# Patient Record
Sex: MALE | NOT HISPANIC OR LATINO
[De-identification: names, ages, dates, MRNs, and addresses within clinical notes are randomized per-mention and may not be internally consistent; named-entity substitution may affect disease eponyms.]

---

## 2017-01-04 PROCEDURE — 87088 URINE BACTERIA CULTURE: CPT | Performed by: UROLOGY

## 2017-01-04 PROCEDURE — 87086 URINE CULTURE/COLONY COUNT: CPT | Performed by: UROLOGY

## 2017-01-04 PROCEDURE — 87186 SC STD MICRODIL/AGAR DIL: CPT | Performed by: UROLOGY

## 2017-01-04 PROCEDURE — 81001 URINALYSIS AUTO W/SCOPE: CPT | Performed by: UROLOGY

## 2017-02-21 PROCEDURE — 88108 CYTOPATH CONCENTRATE TECH: CPT | Performed by: UROLOGY

## 2017-04-27 PROCEDURE — 87086 URINE CULTURE/COLONY COUNT: CPT | Performed by: UROLOGY

## 2017-05-10 ENCOUNTER — PRIOR ORIGINAL RECORDS (OUTPATIENT)
Dept: OTHER | Age: 77
End: 2017-05-10

## 2017-05-18 ENCOUNTER — PRIOR ORIGINAL RECORDS (OUTPATIENT)
Dept: OTHER | Age: 77
End: 2017-05-18

## 2017-07-17 ENCOUNTER — HOSPITAL ENCOUNTER (OUTPATIENT)
Dept: MRI IMAGING | Facility: HOSPITAL | Age: 77
Discharge: HOME | End: 2017-07-17
Attending: NURSE PRACTITIONER
Payer: MEDICARE

## 2017-07-17 DIAGNOSIS — K62.89 ANAL OR RECTAL PAIN: ICD-10-CM

## 2017-07-17 PROCEDURE — A9575 INJ GADOTERATE MEGLUMI 0.1ML: HCPCS | Performed by: RADIOLOGY

## 2017-07-17 PROCEDURE — 72196 MRI PELVIS W/DYE: CPT | Performed by: NURSE PRACTITIONER

## 2017-11-14 PROCEDURE — 81015 MICROSCOPIC EXAM OF URINE: CPT | Performed by: UROLOGY

## 2017-11-14 PROCEDURE — 87086 URINE CULTURE/COLONY COUNT: CPT | Performed by: UROLOGY

## 2017-12-31 ENCOUNTER — PRIOR ORIGINAL RECORDS (OUTPATIENT)
Dept: OTHER | Age: 77
End: 2017-12-31

## 2018-03-01 ENCOUNTER — HOSPITAL ENCOUNTER (OUTPATIENT)
Dept: CV DIAGNOSTICS | Facility: HOSPITAL | Age: 78
Discharge: HOME | End: 2018-03-01
Attending: INTERNAL MEDICINE
Payer: MEDICARE

## 2018-03-01 DIAGNOSIS — I48.0 PAROXYSMAL A-FIB (HCC): ICD-10-CM

## 2018-03-01 DIAGNOSIS — I25.10 CORONARY ARTERY DISEASE INVOLVING NATIVE CORONARY ARTERY OF NATIVE HEART WITHOUT ANGINA PECTORIS: ICD-10-CM

## 2018-03-01 DIAGNOSIS — Z79.01 ANTICOAGULANT LONG-TERM USE: ICD-10-CM

## 2018-03-01 PROCEDURE — 78452 HT MUSCLE IMAGE SPECT MULT: CPT | Performed by: INTERNAL MEDICINE

## 2018-03-01 PROCEDURE — 93018 CV STRESS TEST I&R ONLY: CPT | Performed by: INTERNAL MEDICINE

## 2018-03-01 PROCEDURE — 93017 CV STRESS TEST TRACING ONLY: CPT | Performed by: INTERNAL MEDICINE

## 2018-05-16 ENCOUNTER — PRIOR ORIGINAL RECORDS (OUTPATIENT)
Dept: OTHER | Age: 78
End: 2018-05-16

## 2018-08-28 ENCOUNTER — APPOINTMENT (OUTPATIENT)
Dept: LAB | Facility: HOSPITAL | Age: 78
End: 2018-08-28
Attending: INTERNAL MEDICINE
Payer: MEDICARE

## 2018-08-28 DIAGNOSIS — I48.20 CHRONIC ATRIAL FIBRILLATION (HCC): ICD-10-CM

## 2018-08-28 DIAGNOSIS — Z79.01 ANTICOAGULANT LONG-TERM USE: ICD-10-CM

## 2018-08-28 DIAGNOSIS — I25.10 CORONARY ARTERY DISEASE INVOLVING NATIVE CORONARY ARTERY OF NATIVE HEART WITHOUT ANGINA PECTORIS: ICD-10-CM

## 2018-08-28 DIAGNOSIS — E78.5 DYSLIPIDEMIA: ICD-10-CM

## 2018-08-28 LAB
ALBUMIN SERPL-MCNC: 3.9 G/DL (ref 3.5–4.8)
ALP LIVER SERPL-CCNC: 66 U/L (ref 45–117)
ALT SERPL-CCNC: 29 U/L (ref 17–63)
AST SERPL-CCNC: 18 U/L (ref 15–41)
BILIRUB DIRECT SERPL-MCNC: 0.2 MG/DL (ref 0.1–0.5)
BILIRUB SERPL-MCNC: 0.7 MG/DL (ref 0.1–2)
CHOLEST SMN-MCNC: 123 MG/DL (ref ?–200)
EST. AVERAGE GLUCOSE BLD GHB EST-MCNC: 126 MG/DL (ref 68–126)
HBA1C MFR BLD HPLC: 6 % (ref ?–5.7)
HDLC SERPL-MCNC: 30 MG/DL (ref 40–59)
LDLC SERPL CALC-MCNC: 55 MG/DL (ref ?–100)
M PROTEIN MFR SERPL ELPH: 7 G/DL (ref 6.1–8.3)
NONHDLC SERPL-MCNC: 93 MG/DL (ref ?–130)
TRIGL SERPL-MCNC: 189 MG/DL (ref 30–149)
VLDLC SERPL CALC-MCNC: 38 MG/DL (ref 0–30)

## 2018-08-28 PROCEDURE — 80076 HEPATIC FUNCTION PANEL: CPT

## 2018-08-28 PROCEDURE — 83036 HEMOGLOBIN GLYCOSYLATED A1C: CPT

## 2018-08-28 PROCEDURE — 80061 LIPID PANEL: CPT

## 2018-08-28 PROCEDURE — 36415 COLL VENOUS BLD VENIPUNCTURE: CPT

## 2018-09-22 ENCOUNTER — HOSPITAL ENCOUNTER (OUTPATIENT)
Dept: MRI IMAGING | Facility: HOSPITAL | Age: 78
Discharge: HOME | End: 2018-09-22
Attending: INTERNAL MEDICINE
Payer: MEDICARE

## 2018-09-22 DIAGNOSIS — K86.2 PANCREAS CYST: ICD-10-CM

## 2018-09-22 LAB — CREAT SERPL-MCNC: 1.1 MG/DL (ref 0.7–1.3)

## 2018-09-22 PROCEDURE — 76376 3D RENDER W/INTRP POSTPROCES: CPT | Performed by: INTERNAL MEDICINE

## 2018-09-22 PROCEDURE — A9575 INJ GADOTERATE MEGLUMI 0.1ML: HCPCS | Performed by: INTERNAL MEDICINE

## 2018-09-22 PROCEDURE — 74183 MRI ABD W/O CNTR FLWD CNTR: CPT | Performed by: INTERNAL MEDICINE

## 2018-09-22 PROCEDURE — 82565 ASSAY OF CREATININE: CPT

## 2018-09-29 NOTE — PROGRESS NOTES
Date: 2018    To: Jaclyn Vora  : 1940    I hope this letter finds you doing well.   I am writing to inform you of the following:     Compare   The results of your recent MRI: Compared to the MRI , there is increase in the size and num

## 2018-11-01 PROBLEM — K86.2 PANCREAS CYST: Status: ACTIVE | Noted: 2018-11-01

## 2018-12-31 ENCOUNTER — PRIOR ORIGINAL RECORDS (OUTPATIENT)
Dept: OTHER | Age: 78
End: 2018-12-31

## 2019-02-07 ENCOUNTER — APPOINTMENT (OUTPATIENT)
Dept: CT IMAGING | Facility: HOSPITAL | Age: 79
End: 2019-02-07
Attending: STUDENT IN AN ORGANIZED HEALTH CARE EDUCATION/TRAINING PROGRAM
Payer: MEDICARE

## 2019-02-07 ENCOUNTER — HOSPITAL ENCOUNTER (EMERGENCY)
Facility: HOSPITAL | Age: 79
Discharge: HOME | End: 2019-02-08
Attending: STUDENT IN AN ORGANIZED HEALTH CARE EDUCATION/TRAINING PROGRAM
Payer: MEDICARE

## 2019-02-07 VITALS
HEART RATE: 86 BPM | SYSTOLIC BLOOD PRESSURE: 146 MMHG | TEMPERATURE: 98 F | WEIGHT: 225 LBS | OXYGEN SATURATION: 96 % | RESPIRATION RATE: 18 BRPM | DIASTOLIC BLOOD PRESSURE: 75 MMHG | HEIGHT: 68 IN | BODY MASS INDEX: 34.1 KG/M2

## 2019-02-07 DIAGNOSIS — N20.1 URETEROLITHIASIS: Primary | ICD-10-CM

## 2019-02-07 DIAGNOSIS — R31.9 HEMATURIA, UNSPECIFIED TYPE: ICD-10-CM

## 2019-02-07 DIAGNOSIS — N40.0 ENLARGED PROSTATE: ICD-10-CM

## 2019-02-07 LAB
ALBUMIN SERPL-MCNC: 4 G/DL (ref 3.1–4.5)
ALBUMIN/GLOB SERPL: 1.4 {RATIO} (ref 1–2)
ALP LIVER SERPL-CCNC: 63 U/L (ref 45–117)
ALT SERPL-CCNC: 33 U/L (ref 17–63)
ANION GAP SERPL CALC-SCNC: 9 MMOL/L (ref 0–18)
AST SERPL-CCNC: 28 U/L (ref 15–41)
BASOPHILS # BLD AUTO: 0.04 X10(3) UL (ref 0–0.2)
BASOPHILS NFR BLD AUTO: 0.5 %
BILIRUB SERPL-MCNC: 0.7 MG/DL (ref 0.1–2)
BILIRUB UR QL STRIP.AUTO: NEGATIVE
BUN BLD-MCNC: 16 MG/DL (ref 8–20)
BUN/CREAT SERPL: 15.8 (ref 10–20)
CALCIUM BLD-MCNC: 9.4 MG/DL (ref 8.3–10.3)
CHLORIDE SERPL-SCNC: 107 MMOL/L (ref 101–111)
CLARITY UR REFRACT.AUTO: CLEAR
CO2 SERPL-SCNC: 25 MMOL/L (ref 22–32)
COLOR UR AUTO: YELLOW
CREAT BLD-MCNC: 1.01 MG/DL (ref 0.7–1.3)
DEPRECATED RDW RBC AUTO: 43.8 FL (ref 35.1–46.3)
EOSINOPHIL # BLD AUTO: 0.23 X10(3) UL (ref 0–0.7)
EOSINOPHIL NFR BLD AUTO: 2.8 %
ERYTHROCYTE [DISTWIDTH] IN BLOOD BY AUTOMATED COUNT: 12.6 % (ref 11–15)
GLOBULIN PLAS-MCNC: 2.9 G/DL (ref 2.8–4.4)
GLUCOSE BLD-MCNC: 93 MG/DL (ref 70–99)
GLUCOSE UR STRIP.AUTO-MCNC: NEGATIVE MG/DL
HCT VFR BLD AUTO: 41 % (ref 39–53)
HGB BLD-MCNC: 14.3 G/DL (ref 13–17.5)
IMM GRANULOCYTES # BLD AUTO: 0.02 X10(3) UL (ref 0–1)
IMM GRANULOCYTES NFR BLD: 0.2 %
LEUKOCYTE ESTERASE UR QL STRIP.AUTO: NEGATIVE
LIPASE: 205 U/L (ref 73–393)
LYMPHOCYTES # BLD AUTO: 1.77 X10(3) UL (ref 1–4)
LYMPHOCYTES NFR BLD AUTO: 21.7 %
M PROTEIN MFR SERPL ELPH: 6.9 G/DL (ref 6.4–8.2)
MCH RBC QN AUTO: 33 PG (ref 26–34)
MCHC RBC AUTO-ENTMCNC: 34.9 G/DL (ref 31–37)
MCV RBC AUTO: 94.7 FL (ref 80–100)
MONOCYTES # BLD AUTO: 0.68 X10(3) UL (ref 0.1–1)
MONOCYTES NFR BLD AUTO: 8.3 %
NEUTROPHILS # BLD AUTO: 5.42 X10 (3) UL (ref 1.5–7.7)
NEUTROPHILS # BLD AUTO: 5.42 X10(3) UL (ref 1.5–7.7)
NEUTROPHILS NFR BLD AUTO: 66.5 %
NITRITE UR QL STRIP.AUTO: NEGATIVE
OSMOLALITY SERPL CALC.SUM OF ELEC: 293 MOSM/KG (ref 275–295)
PH UR STRIP.AUTO: 6 [PH] (ref 4.5–8)
PLATELET # BLD AUTO: 182 10(3)UL (ref 150–450)
POTASSIUM SERPL-SCNC: 3.8 MMOL/L (ref 3.6–5.1)
PROT UR STRIP.AUTO-MCNC: NEGATIVE MG/DL
RBC # BLD AUTO: 4.33 X10(6)UL (ref 3.8–5.8)
RBC #/AREA URNS AUTO: >10 /HPF
SODIUM SERPL-SCNC: 141 MMOL/L (ref 136–144)
SP GR UR STRIP.AUTO: 1.01 (ref 1–1.03)
UROBILINOGEN UR STRIP.AUTO-MCNC: <2 MG/DL
WBC # BLD AUTO: 8.2 X10(3) UL (ref 4–11)

## 2019-02-07 PROCEDURE — 80053 COMPREHEN METABOLIC PANEL: CPT | Performed by: STUDENT IN AN ORGANIZED HEALTH CARE EDUCATION/TRAINING PROGRAM

## 2019-02-07 PROCEDURE — 83690 ASSAY OF LIPASE: CPT | Performed by: STUDENT IN AN ORGANIZED HEALTH CARE EDUCATION/TRAINING PROGRAM

## 2019-02-07 PROCEDURE — 74176 CT ABD & PELVIS W/O CONTRAST: CPT | Performed by: STUDENT IN AN ORGANIZED HEALTH CARE EDUCATION/TRAINING PROGRAM

## 2019-02-07 PROCEDURE — 87086 URINE CULTURE/COLONY COUNT: CPT | Performed by: STUDENT IN AN ORGANIZED HEALTH CARE EDUCATION/TRAINING PROGRAM

## 2019-02-07 PROCEDURE — 81001 URINALYSIS AUTO W/SCOPE: CPT | Performed by: STUDENT IN AN ORGANIZED HEALTH CARE EDUCATION/TRAINING PROGRAM

## 2019-02-07 PROCEDURE — 85025 COMPLETE CBC W/AUTO DIFF WBC: CPT | Performed by: STUDENT IN AN ORGANIZED HEALTH CARE EDUCATION/TRAINING PROGRAM

## 2019-02-07 PROCEDURE — 99284 EMERGENCY DEPT VISIT MOD MDM: CPT

## 2019-02-07 PROCEDURE — 96360 HYDRATION IV INFUSION INIT: CPT

## 2019-02-07 PROCEDURE — 96361 HYDRATE IV INFUSION ADD-ON: CPT

## 2019-02-08 NOTE — ED PROVIDER NOTES
Patient Seen in: [facility] Emergency Department    History   Patient presents with:  Abdomen/Flank Pain (GI/)    Stated Complaint: abdominal pain for 2 day. No BM for 2 days.      HPI    20-year-old male who presents emergency department complaini infarction [facility]) 2004    per EKG   • Neuropathy     bilat lower leg, foot neuropathy   • Osteoarthritis     general   • Pain in joints    • Pancreas cyst     being monitored, last test few months ago; more than one cyst   • Paroxysmal A-fib ([address]    • Casie Device None (Room air)       Current:/75   Pulse 86   Temp 97.8 °F (36.6 °C) (Temporal)   Resp 18   Ht 172.7 cm (5' 8\")   Wt 102.1 kg   SpO2 96%   BMI 34.21 kg/m²         Physical Exam    Constitutional: oriented to person, place, and time, appears DRAW LIGHT GREEN   RAINBOW DRAW GOLD   URINE CULTURE, ROUTINE   CBC W/ DIFFERENTIAL            MDM     Extensive differential was considered including ureterolithiasis, pyelonephritis, hydronephrosis and other urologic, infectious, musculoskeletal, vascula

## 2019-02-08 NOTE — ED INITIAL ASSESSMENT (HPI)
68 y/o male to ED with c/o of abdominal pain and left flank pain. Patient reports pain started x2 days ago, last bowel movement x3 days ago, reports he administered enema at home x2 yesterday which did produce stool.  Patient concerned for bowel obstruction

## 2019-04-27 ENCOUNTER — HOSPITAL ENCOUNTER (OUTPATIENT)
Dept: MRI IMAGING | Facility: HOSPITAL | Age: 79
Discharge: HOME | End: 2019-04-27
Attending: INTERNAL MEDICINE
Payer: MEDICARE

## 2019-04-27 DIAGNOSIS — K86.2 PANCREAS CYST: ICD-10-CM

## 2019-04-27 PROCEDURE — A9575 INJ GADOTERATE MEGLUMI 0.1ML: HCPCS | Performed by: INTERNAL MEDICINE

## 2019-04-27 PROCEDURE — 76376 3D RENDER W/INTRP POSTPROCES: CPT | Performed by: INTERNAL MEDICINE

## 2019-04-27 PROCEDURE — 74183 MRI ABD W/O CNTR FLWD CNTR: CPT | Performed by: INTERNAL MEDICINE

## 2019-05-06 NOTE — PROGRESS NOTES
Date: 2019    To: Nicole Lowery  : 1940    I hope this letter finds you doing well. I am writing to inform you of the following: The results of your recent MRI showed stable pancreas cystic lesions.          I am advising you to under

## 2019-05-15 ENCOUNTER — PRIOR ORIGINAL RECORDS (OUTPATIENT)
Dept: OTHER | Age: 79
End: 2019-05-15

## 2020-10-09 LAB
ALBUMIN/GLOB SERPL: 1.5 (CALC) (ref 1–2.5)
ALBUMIN/GLOB SERPL: 2.1 (CALC) (ref 1–2.5)
ALBUMIN/GLOB SERPL: 2.2 (CALC) (ref 1–2.5)
ALBUMIN/GLOB SERPL: 2.3 (CALC) (ref 1–2.5)
ALBUMIN: 4.1 G/DL (ref 3.6–5.1)
ALBUMIN: 4.1 G/DL (ref 3.6–5.1)
ALBUMIN: 4.5 G/DL (ref 3.6–5.1)
ALBUMIN: 4.5 G/DL (ref 3.6–5.1)
ALKALINE PHOSPHATASE: 55 UNIT/L (ref 40–115)
ALKALINE PHOSPHATASE: 70 UNIT/L (ref 40–115)
ALKALINE PHOSPHATASE: 74 UNIT/L (ref 40–115)
ALKALINE PHOSPHATASE: 83 UNIT/L (ref 40–115)
ALT: 18 UNIT/L (ref 9–46)
ALT: 18 UNIT/L (ref 9–46)
ALT: 25 UNIT/L (ref 9–46)
ALT: 9 UNIT/L (ref 9–46)
AST: 14 UNIT/L (ref 10–35)
AST: 17 UNIT/L (ref 10–35)
AST: 18 UNIT/L (ref 10–35)
AST: 23 UNIT/L (ref 10–35)
BASO%: 0.3 %
BASO%: 0.4 %
BASO%: 0.4 %
BASO: 0 10^3/UL
BILIRUBIN, TOTAL: 0.4 MG/DL (ref 0.2–1.2)
BILIRUBIN, TOTAL: 0.5 MG/DL (ref 0.2–1.2)
BILIRUBIN, TOTAL: 0.6 MG/DL (ref 0.2–1.2)
BILIRUBIN, TOTAL: 0.6 MG/DL (ref 0.2–1.2)
BUN/CREATININE RATIO: 37 (CALC) (ref 6–22)
BUN/CREATININE RATIO: ABNORMAL (CALC) (ref 6–22)
CALCIUM: 10.1 MG/DL (ref 8.6–10.3)
CALCIUM: 9.4 MG/DL (ref 8.6–10.3)
CALCIUM: 9.5 MG/DL (ref 8.6–10.3)
CALCIUM: 9.7 MG/DL (ref 8.6–10.3)
CARBON DIOXIDE: 21 MMOL/L (ref 20–31)
CARBON DIOXIDE: 23 MMOL/L (ref 20–31)
CARBON DIOXIDE: 23 MMOL/L (ref 20–31)
CARBON DIOXIDE: 23 MMOL/L (ref 20–32)
CHLORIDE: 105 MMOL/L (ref 98–110)
CHLORIDE: 106 MMOL/L (ref 98–110)
CRCL (C&G) (MOSAIQ HL): 101.08 ML/MIN
CRCL (C&G) (MOSAIQ HL): 107.74 ML/MIN
CRCL (C&G) (MOSAIQ HL): 91.41 ML/MIN
CRCL (C&G) (MOSAIQ HL): 99.05 ML/MIN
CREATININE CLEARANCE (MOSAIQ HL): 54.5 ML/MIN
CREATININE CLEARANCE (MOSAIQ HL): 57.8 ML/MIN
CREATININE CLEARANCE (MOSAIQ HL): 58.2 ML/MIN
CREATININE CLEARANCE (MOSAIQ HL): 64.3 ML/MIN
CREATININE: 0.84 MG/DL (ref 0.7–1.18)
CREATININE: 0.9 MG/DL (ref 0.7–1.18)
CREATININE: 0.92 MG/DL (ref 0.7–1.18)
CREATININE: 0.99 MG/DL (ref 0.7–1.18)
EGFR AFRICAN AMERICAN: 86 ML/MIN/1.73M2
EGFR AFRICAN AMERICAN: 93 ML/MIN/1.73M2
EGFR AFRICAN AMERICAN: 95 ML/MIN/1.73M2
EGFR AFRICAN AMERICAN: 99 ML/MIN/1.73M2
EGFR NON-AFR. AMERICAN: 74 ML/MIN/1.73M2
EGFR NON-AFR. AMERICAN: 81 ML/MIN/1.73M2
EGFR NON-AFR. AMERICAN: 82 ML/MIN/1.73M2
EGFR NON-AFR. AMERICAN: 86 ML/MIN/1.73M2
EOS%: 4 %
EOS%: 4.2 %
EOS%: 4.4 %
EOS: 0.2 10^3/UL
EOS: 0.2 10^3/UL
EOS: 0.3 10^3/UL
GLOBULIN: 1.9 G/DL (CALC) (ref 1.9–3.7)
GLOBULIN: 2 G/DL (CALC) (ref 1.9–3.7)
GLOBULIN: 2 G/DL (CALC) (ref 1.9–3.7)
GLOBULIN: 3.1 G/DL (CALC) (ref 1.9–3.7)
GLUCOSE: 107 MG/DL (ref 65–99)
GLUCOSE: 126 MG/DL (ref 65–99)
GLUCOSE: 128 MG/DL (ref 65–99)
GLUCOSE: 143 MG/DL (ref 65–99)
HCT: 38.8 % (ref 38–54)
HCT: 38.9 % (ref 38–54)
HCT: 40.3 % (ref 38–54)
HGB: 13.3 G/DL (ref 12–18)
HGB: 13.6 G/DL (ref 12–18)
HGB: 13.8 G/DL (ref 12–18)
IG A: 128 MG/DL (ref 81–463)
IG A: 41 MG/DL (ref 81–463)
IG A: 46 MG/DL (ref 81–463)
IG A: 48 MG/DL (ref 81–463)
IG G: 1267 MG/DL (ref 694–1618)
IG G: 398 MG/DL (ref 694–1618)
IG G: 414 MG/DL (ref 694–1618)
IG G: 429 MG/DL (ref 694–1618)
IG M: 49 MG/DL (ref 48–271)
IG M: 8 MG/DL (ref 48–271)
IG M: 8 MG/DL (ref 48–271)
IG M: <5 MG/DL (ref 48–271)
LYMPH%: 22.1 % (ref 12–44)
LYMPH%: 22.4 % (ref 12–44)
LYMPH%: 28.3 % (ref 12–44)
LYMPH: 1.2 10^3/UL (ref 0.8–2.8)
LYMPH: 1.3 10^3/UL (ref 0.8–2.8)
LYMPH: 1.6 10^3/UL (ref 0.8–2.8)
MCH: 33.1 PG (ref 26–33)
MCH: 33.3 PG (ref 26–33)
MCH: 33.4 PG (ref 26–33)
MCHC: 34.2 G/DL (ref 31–36)
MCHC: 34.2 G/DL (ref 31–36)
MCHC: 35.1 G/DL (ref 31–36)
MCV: 94.4 FML (ref 82–100)
MCV: 97.3 FML (ref 82–100)
MCV: 97.6 FML (ref 82–100)
MONO%: 6.8 % (ref 2–12)
MONO%: 7.7 % (ref 2–12)
MONO%: 8.4 % (ref 2–12)
MONO: 0.4 10^3/UL (ref 0.2–1)
MPV: 8.7 FML (ref 8.6–11.7)
MPV: 8.8 FML (ref 8.6–11.7)
MPV: 9 FML (ref 8.6–11.7)
NEUT%: 59.2 % (ref 47–76)
NEUT%: 64.6 % (ref 47–76)
NEUT%: 66.8 % (ref 47–76)
NEUT: 3.4 10^3/UL (ref 1.5–7.1)
NEUT: 3.4 10^3/UL (ref 1.5–7.1)
NEUT: 4 10^3/UL (ref 1.5–7.1)
PLT: 170 10^3/UL (ref 150–375)
PLT: 171 10^3/UL (ref 150–375)
PLT: 186 10^3/UL (ref 150–375)
POTASSIUM: 3.9 MMOL/L (ref 3.5–5.3)
POTASSIUM: 3.9 MMOL/L (ref 3.5–5.3)
POTASSIUM: 4 MMOL/L (ref 3.5–5.3)
POTASSIUM: 4.2 MMOL/L (ref 3.5–5.3)
PROTEIN, TOTAL: 6 G/DL (ref 6.1–8.1)
PROTEIN, TOTAL: 6.1 G/DL (ref 6.1–8.1)
PROTEIN, TOTAL: 6.5 G/DL (ref 6.1–8.1)
PROTEIN, TOTAL: 7.6 G/DL (ref 6.1–8.1)
RBC: 4 10^6/UL (ref 4.2–6.2)
RBC: 4.11 10^6/UL (ref 4.2–6.2)
RBC: 4.13 10^6/UL (ref 4.2–6.2)
RDW-CV: 12.7 %
RDW-CV: 12.8 %
RDW-CV: 13.2 %
RDW-SD: 43 FML (ref 36–50)
RDW-SD: 44.5 FML (ref 36–50)
RDW-SD: 45.7 FML (ref 36–50)
SODIUM: 139 MMOL/L (ref 135–146)
SODIUM: 142 MMOL/L (ref 135–146)
UREA NITROGEN (BUN): 18 MG/DL (ref 7–25)
UREA NITROGEN (BUN): 20 MG/DL (ref 7–25)
UREA NITROGEN (BUN): 23 MG/DL (ref 7–25)
UREA NITROGEN (BUN): 31 MG/DL (ref 7–25)
WBC: 5.3 10^3/UL (ref 4.3–11)
WBC: 5.7 10^3/UL (ref 4.3–11)
WBC: 6.1 10^3/UL (ref 4.3–11)

## 2020-10-11 VITALS
BODY MASS INDEX: 40.61 KG/M2 | DIASTOLIC BLOOD PRESSURE: 62 MMHG | SYSTOLIC BLOOD PRESSURE: 128 MMHG | WEIGHT: 229.19 LBS | HEIGHT: 63 IN

## 2020-10-11 VITALS
BODY MASS INDEX: 39.16 KG/M2 | DIASTOLIC BLOOD PRESSURE: 66 MMHG | WEIGHT: 220.99 LBS | SYSTOLIC BLOOD PRESSURE: 120 MMHG | HEIGHT: 63 IN

## 2020-10-11 VITALS — DIASTOLIC BLOOD PRESSURE: 56 MMHG | SYSTOLIC BLOOD PRESSURE: 146 MMHG | WEIGHT: 225.99 LBS

## 2020-12-31 ENCOUNTER — PRIOR ORIGINAL RECORDS (OUTPATIENT)
Dept: OTHER | Age: 80
End: 2020-12-31

## 2021-03-09 DIAGNOSIS — Z23 NEED FOR VACCINATION: ICD-10-CM

## (undated) NOTE — ED AVS SNAPSHOT
Tommie Cheung   MRN: [id number]    Department:  [facility] Emergency Department   Date of Visit:  2/7/2019           Disclosure     Insurance plans vary and the physician(s) referred by the ER may not be covered by your plan.  Please contact you tell this physician (or your personal doctor if your instructions are to return to your personal doctor) about any new or lasting problems. The primary care or specialist physician will see patients referred from the [facility] Emergency Department.  Mary Eugene